# Patient Record
Sex: FEMALE | Race: BLACK OR AFRICAN AMERICAN
[De-identification: names, ages, dates, MRNs, and addresses within clinical notes are randomized per-mention and may not be internally consistent; named-entity substitution may affect disease eponyms.]

---
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
## 2018-11-17 ENCOUNTER — HOSPITAL ENCOUNTER (EMERGENCY)
Dept: HOSPITAL 92 - ERS | Age: 18
Discharge: HOME | End: 2018-11-17
Payer: COMMERCIAL
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
## 2018-11-17 DIAGNOSIS — S20.219A: ICD-10-CM

## 2018-11-17 DIAGNOSIS — S13.4XXA: Primary | ICD-10-CM

## 2018-11-17 PROCEDURE — 72125 CT NECK SPINE W/O DYE: CPT

## 2018-11-17 PROCEDURE — 71046 X-RAY EXAM CHEST 2 VIEWS: CPT

## 2018-11-17 NOTE — CT
CERVICAL SPINE CT WITHOUT CONTRAST:

11/17/18

 

COMPARISON:  

None.

 

HISTORY: 

Injury, trauma, pain. 

 

TECHNIQUE:  

Serial axial CT imaging at 2.5 mm intervals from the skull base through the lung apices without contr
ast. Coronal and sagittal reformatted imaging obtained. 

 

FINDINGS:  

The occipital condyles, the dens, and the C1-2 articulation appear grossly unremarkable. The cranioce
rvical junction and the atlanto-axial interspace appear within normal limits. 

 

There is reversal of the normal cervical lordosis which could be on the basis of positioning, spasm, 
or ligamentous injury. There is no focal area of prevertebral soft tissue swelling noted. There is no
 displaced fracture or evidence of dislocation. 

 

IMPRESSION:  

Straightening of the normal cervical lordosis as detailed above. No displaced fracture or evidence of
 dislocation. If there is concern for ligamentous injury, MRI suggested. 

 

POS: [email or web address]

## 2018-11-17 NOTE — RAD
FRONTAL AND LATERAL IMAGING CHEST:

11/17/18

 

COMPARISON:  

None.

 

HISTORY: 

Trauma, pain.

 

FINDINGS:  

There is severe levoscoliosis involving the lower thoracic spine. There is no pneumothorax, pleural f
luid, focal consolidation or alveolar edema. 

 

IMPRESSION:  

Lower thoracic spine levoscoliosis. No acute findings. 

 

POS: [facility]

## 2020-10-19 ENCOUNTER — HOSPITAL ENCOUNTER (EMERGENCY)
Dept: HOSPITAL 92 - ERS | Age: 20
Discharge: HOME | End: 2020-10-19
Payer: COMMERCIAL

## 2020-10-19 DIAGNOSIS — R10.13: ICD-10-CM

## 2020-10-19 DIAGNOSIS — R11.2: Primary | ICD-10-CM

## 2020-10-19 LAB
ALBUMIN SERPL BCG-MCNC: 4.1 G/DL (ref 3.5–5)
ALP SERPL-CCNC: 60 U/L (ref 40–110)
ALT SERPL W P-5'-P-CCNC: 17 U/L (ref 8–55)
ANION GAP SERPL CALC-SCNC: 14 MMOL/L (ref 10–20)
AST SERPL-CCNC: 31 U/L (ref 5–34)
BACTERIA UR QL AUTO: (no result) HPF
BASOPHILS # BLD AUTO: 0 THOU/UL (ref 0–0.2)
BASOPHILS NFR BLD AUTO: 0.3 % (ref 0–1)
BILIRUB SERPL-MCNC: 0.2 MG/DL (ref 0.2–1.2)
BUN SERPL-MCNC: 8 MG/DL (ref 7–18.7)
CALCIUM SERPL-MCNC: 10.3 MG/DL (ref 7.8–10.44)
CHLORIDE SERPL-SCNC: 106 MMOL/L (ref 98–107)
CO2 SERPL-SCNC: 22 MMOL/L (ref 22–29)
CREAT CL PREDICTED SERPL C-G-VRATE: 0 ML/MIN (ref 70–130)
EOSINOPHIL # BLD AUTO: 0 THOU/UL (ref 0–0.7)
EOSINOPHIL NFR BLD AUTO: 0 % (ref 0–10)
GLOBULIN SER CALC-MCNC: 4.2 G/DL (ref 2.4–3.5)
GLUCOSE SERPL-MCNC: 95 MG/DL (ref 70–105)
HGB BLD-MCNC: 12.7 G/DL (ref 12–16)
LYMPHOCYTES # BLD: 0.8 THOU/UL (ref 1.2–3.4)
LYMPHOCYTES NFR BLD AUTO: 11.8 % (ref 21–51)
MCH RBC QN AUTO: 27.3 PG (ref 27–31)
MCV RBC AUTO: 79.9 FL (ref 78–98)
MONOCYTES # BLD AUTO: 0.4 THOU/UL (ref 0.11–0.59)
MONOCYTES NFR BLD AUTO: 5.1 % (ref 0–10)
NEUTROPHILS # BLD AUTO: 5.6 THOU/UL (ref 1.4–6.5)
NEUTROPHILS NFR BLD AUTO: 82.8 % (ref 42–75)
PLATELET # BLD AUTO: 208 THOU/UL (ref 130–400)
POTASSIUM SERPL-SCNC: 3.4 MMOL/L (ref 3.5–5.1)
PREGU CONTROL BACKGROUND?: (no result)
PREGU CONTROL BAR APPEAR?: YES
PROT UR STRIP.AUTO-MCNC: 30 MG/DL
RBC # BLD AUTO: 4.65 MILL/UL (ref 4.2–5.4)
RBC UR QL AUTO: (no result) HPF (ref 0–3)
SODIUM SERPL-SCNC: 139 MMOL/L (ref 136–145)
SP GR UR STRIP: 1.02 (ref 1–1.04)
WBC # BLD AUTO: 6.8 THOU/UL (ref 4.8–10.8)
WBC UR QL AUTO: (no result) HPF (ref 0–3)

## 2020-10-19 PROCEDURE — 81015 MICROSCOPIC EXAM OF URINE: CPT

## 2020-10-19 PROCEDURE — 93005 ELECTROCARDIOGRAM TRACING: CPT

## 2020-10-19 PROCEDURE — 81003 URINALYSIS AUTO W/O SCOPE: CPT

## 2020-10-19 PROCEDURE — 87086 URINE CULTURE/COLONY COUNT: CPT

## 2020-10-19 PROCEDURE — 96365 THER/PROPH/DIAG IV INF INIT: CPT

## 2020-10-19 PROCEDURE — 85025 COMPLETE CBC W/AUTO DIFF WBC: CPT

## 2020-10-19 PROCEDURE — 81025 URINE PREGNANCY TEST: CPT

## 2020-10-19 PROCEDURE — 36415 COLL VENOUS BLD VENIPUNCTURE: CPT

## 2020-10-19 PROCEDURE — 96361 HYDRATE IV INFUSION ADD-ON: CPT

## 2020-10-19 PROCEDURE — 87186 SC STD MICRODIL/AGAR DIL: CPT

## 2020-10-19 PROCEDURE — 80053 COMPREHEN METABOLIC PANEL: CPT

## 2020-10-19 PROCEDURE — 83690 ASSAY OF LIPASE: CPT

## 2020-10-19 PROCEDURE — 87077 CULTURE AEROBIC IDENTIFY: CPT

## 2022-09-19 ENCOUNTER — HOSPITAL ENCOUNTER (EMERGENCY)
Dept: HOSPITAL 57 - BURERS | Age: 22
Discharge: HOME | End: 2022-09-19
Payer: COMMERCIAL

## 2022-09-19 DIAGNOSIS — B96.20: ICD-10-CM

## 2022-09-19 DIAGNOSIS — Z3A.01: ICD-10-CM

## 2022-09-19 DIAGNOSIS — N39.0: ICD-10-CM

## 2022-09-19 DIAGNOSIS — O23.41: Primary | ICD-10-CM

## 2022-09-19 LAB
ALBUMIN SERPL BCG-MCNC: 4.5 G/DL (ref 3.5–5)
ALP SERPL-CCNC: 60 U/L (ref 40–110)
ALT SERPL W P-5'-P-CCNC: 15 U/L (ref 8–55)
ANION GAP SERPL CALC-SCNC: 16 MMOL/L (ref 10–20)
AST SERPL-CCNC: 19 U/L (ref 5–34)
BACTERIA UR QL AUTO: (no result) HPF
BASOPHILS # BLD AUTO: 0.1 THOU/UL (ref 0–0.2)
BASOPHILS NFR BLD AUTO: 0.8 % (ref 0–1)
BILIRUB SERPL-MCNC: 0.5 MG/DL (ref 0.2–1.2)
BUN SERPL-MCNC: 7 MG/DL (ref 7–18.7)
CALCIUM SERPL-MCNC: 9.6 MG/DL (ref 7.8–10.44)
CHLORIDE SERPL-SCNC: 104 MMOL/L (ref 98–107)
CO2 SERPL-SCNC: 20 MMOL/L (ref 22–29)
CREAT CL PREDICTED SERPL C-G-VRATE: 0 ML/MIN (ref 70–130)
EOSINOPHIL # BLD AUTO: 0 THOU/UL (ref 0–0.7)
EOSINOPHIL NFR BLD AUTO: 0.1 % (ref 0–10)
GLOBULIN SER CALC-MCNC: 3.8 G/DL (ref 2.4–3.5)
GLUCOSE SERPL-MCNC: 86 MG/DL (ref 70–105)
HGB BLD-MCNC: 14.4 G/DL (ref 12–16)
LIPASE SERPL-CCNC: 40 U/L (ref 8–78)
LYMPHOCYTES # BLD AUTO: 1.5 THOU/UL (ref 1.2–3.4)
LYMPHOCYTES NFR BLD AUTO: 21.2 % (ref 21–51)
MCH RBC QN AUTO: 29.4 PG (ref 27–31)
MCV RBC AUTO: 87.3 FL (ref 78–98)
MONOCYTES # BLD AUTO: 0.3 THOU/UL (ref 0.11–0.59)
MONOCYTES NFR BLD AUTO: 4.9 % (ref 0–10)
NEUTROPHILS # BLD AUTO: 5.1 THOU/UL (ref 1.4–6.5)
NEUTROPHILS NFR BLD AUTO: 73.1 % (ref 42–75)
PLATELET # BLD AUTO: 218 THOU/UL (ref 130–400)
POTASSIUM SERPL-SCNC: 3.4 MMOL/L (ref 3.5–5.1)
RBC # BLD AUTO: 4.89 MILL/UL (ref 4.2–5.4)
RBC UR QL AUTO: (no result) HPF (ref 0–3)
SODIUM SERPL-SCNC: 137 MMOL/L (ref 136–145)
SP GR UR STRIP: 1.02 (ref 1–1.03)
WBC # BLD AUTO: 7 THOU/UL (ref 4.8–10.8)
WBC UR QL AUTO: (no result) HPF (ref 0–3)

## 2022-09-19 PROCEDURE — 81015 MICROSCOPIC EXAM OF URINE: CPT

## 2022-09-19 PROCEDURE — 83690 ASSAY OF LIPASE: CPT

## 2022-09-19 PROCEDURE — 84702 CHORIONIC GONADOTROPIN TEST: CPT

## 2022-09-19 PROCEDURE — 96361 HYDRATE IV INFUSION ADD-ON: CPT

## 2022-09-19 PROCEDURE — 80053 COMPREHEN METABOLIC PANEL: CPT

## 2022-09-19 PROCEDURE — 87086 URINE CULTURE/COLONY COUNT: CPT

## 2022-09-19 PROCEDURE — 87186 SC STD MICRODIL/AGAR DIL: CPT

## 2022-09-19 PROCEDURE — 85025 COMPLETE CBC W/AUTO DIFF WBC: CPT

## 2022-09-19 PROCEDURE — 81003 URINALYSIS AUTO W/O SCOPE: CPT

## 2022-09-19 PROCEDURE — 96374 THER/PROPH/DIAG INJ IV PUSH: CPT

## 2022-11-30 ENCOUNTER — HOSPITAL ENCOUNTER (EMERGENCY)
Dept: HOSPITAL 57 - BURERS | Age: 22
Discharge: HOME | End: 2022-11-30
Payer: COMMERCIAL

## 2022-11-30 DIAGNOSIS — A08.4: ICD-10-CM

## 2022-11-30 DIAGNOSIS — Z3A.18: ICD-10-CM

## 2022-11-30 DIAGNOSIS — O99.612: Primary | ICD-10-CM

## 2022-11-30 LAB
ALBUMIN SERPL BCG-MCNC: 3.9 G/DL (ref 3.5–5)
ALP SERPL-CCNC: 55 U/L (ref 40–110)
ALT SERPL W P-5'-P-CCNC: 11 U/L (ref 8–55)
ANION GAP SERPL CALC-SCNC: 12 MMOL/L (ref 10–20)
AST SERPL-CCNC: 16 U/L (ref 5–34)
BACTERIA UR QL AUTO: (no result) HPF
BASOPHILS # BLD AUTO: 0.1 THOU/UL (ref 0–0.2)
BASOPHILS NFR BLD AUTO: 0.8 % (ref 0–1)
BILIRUB SERPL-MCNC: 0.3 MG/DL (ref 0.2–1.2)
BUN SERPL-MCNC: 6 MG/DL (ref 7–18.7)
CALCIUM SERPL-MCNC: 9.2 MG/DL (ref 7.8–10.44)
CHLORIDE SERPL-SCNC: 104 MMOL/L (ref 98–107)
CO2 SERPL-SCNC: 24 MMOL/L (ref 22–29)
CREAT CL PREDICTED SERPL C-G-VRATE: 0 ML/MIN (ref 70–130)
EOSINOPHIL # BLD AUTO: 0 THOU/UL (ref 0–0.7)
EOSINOPHIL NFR BLD AUTO: 0.6 % (ref 0–10)
GLOBULIN SER CALC-MCNC: 3.5 G/DL (ref 2.4–3.5)
GLUCOSE SERPL-MCNC: 73 MG/DL (ref 70–105)
HGB BLD-MCNC: 12.2 G/DL (ref 12–16)
LYMPHOCYTES # BLD AUTO: 1.5 THOU/UL (ref 1.2–3.4)
LYMPHOCYTES NFR BLD AUTO: 19.1 % (ref 21–51)
MCH RBC QN AUTO: 29.9 PG (ref 27–31)
MCV RBC AUTO: 92.5 FL (ref 78–98)
MONOCYTES # BLD AUTO: 0.5 THOU/UL (ref 0.11–0.59)
MONOCYTES NFR BLD AUTO: 6.1 % (ref 0–10)
NEUTROPHILS # BLD AUTO: 5.8 THOU/UL (ref 1.4–6.5)
NEUTROPHILS NFR BLD AUTO: 73.3 % (ref 42–75)
PLATELET # BLD AUTO: 176 10X3/UL (ref 130–400)
POTASSIUM SERPL-SCNC: 4 MMOL/L (ref 3.5–5.1)
RBC # BLD AUTO: 4.09 MILL/UL (ref 4.2–5.4)
SODIUM SERPL-SCNC: 136 MMOL/L (ref 136–145)
SP GR UR STRIP: 1.02 (ref 1–1.03)
WBC # BLD AUTO: 8 10X3/UL (ref 4.8–10.8)
WBC UR QL AUTO: (no result) HPF (ref 0–3)

## 2022-11-30 PROCEDURE — 80053 COMPREHEN METABOLIC PANEL: CPT

## 2022-11-30 PROCEDURE — 96374 THER/PROPH/DIAG INJ IV PUSH: CPT

## 2022-11-30 PROCEDURE — 81003 URINALYSIS AUTO W/O SCOPE: CPT

## 2022-11-30 PROCEDURE — 85025 COMPLETE CBC W/AUTO DIFF WBC: CPT

## 2022-11-30 PROCEDURE — 81015 MICROSCOPIC EXAM OF URINE: CPT

## 2022-11-30 PROCEDURE — 36415 COLL VENOUS BLD VENIPUNCTURE: CPT

## 2024-01-29 ENCOUNTER — HOSPITAL ENCOUNTER (EMERGENCY)
Dept: HOSPITAL 57 - BURERS | Age: 24
Discharge: HOME | End: 2024-01-29
Payer: COMMERCIAL

## 2024-01-29 DIAGNOSIS — B34.9: Primary | ICD-10-CM

## 2024-01-29 PROCEDURE — 99283 EMERGENCY DEPT VISIT LOW MDM: CPT
